# Patient Record
(demographics unavailable — no encounter records)

---

## 2025-07-09 NOTE — DISCUSSION/SUMMARY
[de-identified] : KIRSTEN ARANDA  is an 60 year-old female who presents to the clinic with months of left knee pain.  There also suffering from sciatica and were given a Medrol Dosepak and a spine surgeon referral.  The symptoms began after a traumatic twisting injury. The patient initially tried OTC medications/NSAIDs with some relief, even though short lasting. Exercise therapy has had only temporary relief.  Radiographic findings show no obvious fracture or deformity,however patient has had continued pain and is unable to return to their usual activities. Given the history along with the physical exam findings of lateral joint line pain and pain with deep squat, I am concerned about a possible meniscus tear. I discussed that radiographs show the bones well but do not show the soft tissues, such as ligaments, tendons and menisci well. At this point, the patient is quite debilitated by her  pain and is unable to return to her  activities of daily living such as []. Given the persistent symptoms, I discussed with the patient that her should continue to avoid strenuous activities while we obtain an MRI to further evaluate for an internal derangement of the knee. The office will work to obtain the MRI.   I discussed with them that I often prescribe an anti-inflammatory that should be taken once a day with meals to decrease pain and expedite symptom relief. They should not take this while also taking Aleve (Naprosyn), Motrin/ Advil (Ibuprofen), Toradol (ketoralac). They must stop taking it if they develops stomach pain, increased bleeding or bruising and they should follow-up with their primary care doctor for routine blood work including kidney function to monitor its effect. While it Is not a habit-forming substance, it should only  be taken as needed and to discontinue use once symptoms have resolved.  She will continue with the Medrol Dosepak instead.   In the interim, the patient should continue to walk and recommend an anti-inflammatory (Meloxicam) as directed if not medically contraindicated. They will schedule follow-up for in person review of the MRI results. They know to call the office or present to the ER if they develop worsening pain, swelling, numbness, tingling, inability to use the leg.   My cumulative time spent on this patients visit included: Preparation for the visit, review of the medical records, review of pertinent diagnostic studies, examination and counseling of the patient on the above diagnosis, treatment plan and prognosis, orders of diagnostic tests, medications and/or appropriate procedures and documentation in the medical records of todays visit.

## 2025-07-09 NOTE — PROCEDURE
[de-identified] : Injection: Left knee joint. Indication: Internal derangement of left kne.  A discussion was had with the patient regarding this procedure and all questions were answered. All risks, benefits and alternatives were discussed. These included but were not limited to bleeding, infection, allergic reaction and reaccumulation of fluid. A timeout was done to ensure correct side and patient agreed to the procedure.  A [place] uriah was created on the skin utilizing a plastic needle cap to uriah the anticipated point of entry. Alcohol was used to clean the skin, and chloraprep was used to sterilize and prep the area in the lateral joint line aspect of the knee. Ethyl chloride spray was then used as a topical anesthetic. A 22-gauge needle was used to inject 4cc 1% lidocaine without epinephrine,  and 2cc of 40mg/ml methylprednisolone into the knee. A sterile bandage was then applied. The patient tolerated the procedure well.

## 2025-07-09 NOTE — HISTORY OF PRESENT ILLNESS
[de-identified] : Patient presents for evaluation of left knee and low back pain.  She recently had a slip and fall while in [facility] and had worsening of her low back and left knee pain.  She notices a clicking sensation and pain with range of motion of the knee.  Is 7-8 out of 10 in severity and she is using a cane for assistive device.  She is also taking Tylenol however it is not helping significantly

## 2025-07-09 NOTE — PHYSICAL EXAM
[de-identified] : General Appearance / Station: Well developed, well nourished, in no acute distress  Orientation: Oriented to person, place, and time Gait & Station: Ambulates without assistive device Neurologic: Normal leg sensation  Cardiovascular: Warm extremity  Lymphatics: No lymphedema  Generalized Ligament Laxity: Normal  Stiffness: Normal   LUMBAR SPINE: tender  at lumbar spine and left paraspinal muscle Straight leg raise: Positive Motor: 5/5 motor L2-S1 Sensation: Intact  L2-S1  LEFT HIP: Range of motion: Painless  internal and external rotation of the hip. Strength: Within Normal Limits  Palpation: Nontender  at greater trochanter. Nontender  at SI joint Stinchfield: Negative  FADIR: Negative  SURAJ: Negative   SYMPTOMATIC LEFT KNEE: Alignment: Neutral Skin: normal Effusion: none . Quadriceps: normal . Range of motion: symmetrical but painful . PF crepitus: 1+. PF apprehension: none . Patella / Patella Tendon: nontender . Lachman's: negative  Valgus @ 30: negative. Varus @ 30: negative. Posterior drawer: negative. 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 No fracture, or dislocation seen at this time. There is grade 1 anterolisthesis L4-5 but no mobility of the lumbar spine seen on flexion/ extension views. No other obvious osseous abnormality. Otherwise unremarkable.  Imaging: AP Pelvis show no significant hip joint space narrowing. There are no signs of fracture. The soft tissues appear unremarkable  Imaging: Standing 4 views of the left knee show left knee mild medial joint space narrowing. There are no signs of fracture. There is no [] knee effusion. The soft tissues appear unremarkable

## 2025-07-17 NOTE — HISTORY OF PRESENT ILLNESS
[Pain Location] : pain [] : left foot [Lumbar] : lumbar region [___ mths] : [unfilled] month(s) ago [10] : a current pain level of 10/10 [Standing] : standing [Daily] : ~He/She~ states the symptoms seem to be occuring daily [Prolonged Sitting] : worsened by prolonged sitting [Prolonged Standing] : worsened by prolonged standing [Rest] : relieved by rest [de-identified] : Patient is a 60-year-old female with a longstanding history of lumbar pain.  In February 2025 patient had started buckling of the left knee, with a total of three significant falls,  secondary to increasing pain to that left knee.   Patient had this evaluated by Dr. Martinez who is requesting for an MRI of that left knee and is requesting the patient to consult with an Orthopedic Spine Specialist, Dr Reich, to rule out these falls that may be originating from lumbar spine secondary to her longstanding history of back pain.  [de-identified] : lying down in bed is the worst

## 2025-07-17 NOTE — REASON FOR VISIT
[Initial Visit] : an initial visit for [Back Pain] : back pain [Radiculopathy] : radiculopathy [Family Member] : family member [FreeTextEntry2] : left leg radiculopathy

## 2025-07-17 NOTE — HISTORY OF PRESENT ILLNESS
[Pain Location] : pain [] : left foot [Lumbar] : lumbar region [___ mths] : [unfilled] month(s) ago [10] : a current pain level of 10/10 [Standing] : standing [Daily] : ~He/She~ states the symptoms seem to be occuring daily [Prolonged Sitting] : worsened by prolonged sitting [Prolonged Standing] : worsened by prolonged standing [Rest] : relieved by rest [de-identified] : Patient is a 60-year-old female with a longstanding history of lumbar pain.  In February 2025 patient had started buckling of the left knee, with a total of three significant falls,  secondary to increasing pain to that left knee.   Patient had this evaluated by Dr. Martinez who is requesting for an MRI of that left knee and is requesting the patient to consult with an Orthopedic Spine Specialist, Dr Reich, to rule out these falls that may be originating from lumbar spine secondary to her longstanding history of back pain.  [de-identified] : lying down in bed is the worst

## 2025-07-17 NOTE — DISCUSSION/SUMMARY
[Medication Risks Reviewed] : Medication risks reviewed [Surgical risks reviewed] : Surgical risks reviewed [6 Weeks] : in 6 weeks [de-identified] : Prescription for physical therapy provided to the patient for core strengthening and hamstring stretching. Patient is to return in 6 weeks to discuss the progress of her back pain and radiculopathy into the left leg.  If symptoms worsen patient is to call the office and we will prescribe an MRI of the lumbar spine for further radiologic evaluation.  I, Dr. Epifanio Riech, personally performed the evaluation and management (E/M) services for this new patient.  That E/M includes conducting the initial examination, assessing all conditions, and establishing a plan of care.  Today, my ACP, Pretty Myers, was here to observe my evaluation and management services for this patient to be followed going forward.

## 2025-07-17 NOTE — PHYSICAL EXAM
[Knee] : patellar 2+ and symmetric bilaterally [Ankle] : ankle 2+ and symmetric bilaterally [Normal RLE] : Right Lower Extremity: No scars, rashes, lesions, ulcers, skin intact [Normal LLE] : Left Lower Extremity: No scars, rashes, lesions, ulcers, skin intact [Normal DTR Reflexes] : DTR reflexes normal [Normal] : No costovertebral angle tenderness and no spinal tenderness [Wide-Based] : wide-based [LE] : Sensory: Intact in bilateral lower extremities [Obese] : obese [de-identified] : pain to the left knee when heel and toe walk. negative tension sign to bilateral LEs [de-identified] : Xrays 4 views of the lumbar spine :  Good alignment of the lumbar spine. Lateral upright with noted mild L4-5 spondylolisthesis, multilevel degenerative changes.  Lateral views of flexion and extension with no gross instability.  No bony tumors, no obvious fractures.

## 2025-07-17 NOTE — PHYSICAL EXAM
[Knee] : patellar 2+ and symmetric bilaterally [Ankle] : ankle 2+ and symmetric bilaterally [Normal RLE] : Right Lower Extremity: No scars, rashes, lesions, ulcers, skin intact [Normal LLE] : Left Lower Extremity: No scars, rashes, lesions, ulcers, skin intact [Normal DTR Reflexes] : DTR reflexes normal [Normal] : No costovertebral angle tenderness and no spinal tenderness [Wide-Based] : wide-based [LE] : Sensory: Intact in bilateral lower extremities [Obese] : obese [de-identified] : pain to the left knee when heel and toe walk. negative tension sign to bilateral LEs [de-identified] : Xrays 4 views of the lumbar spine :  Good alignment of the lumbar spine. Lateral upright with noted mild L4-5 spondylolisthesis, multilevel degenerative changes.  Lateral views of flexion and extension with no gross instability.  No bony tumors, no obvious fractures.

## 2025-07-17 NOTE — DISCUSSION/SUMMARY
[Medication Risks Reviewed] : Medication risks reviewed [Surgical risks reviewed] : Surgical risks reviewed [6 Weeks] : in 6 weeks [de-identified] : Prescription for physical therapy provided to the patient for core strengthening and hamstring stretching. Patient is to return in 6 weeks to discuss the progress of her back pain and radiculopathy into the left leg.  If symptoms worsen patient is to call the office and we will prescribe an MRI of the lumbar spine for further radiologic evaluation.  I, Dr. Epifanio Reich, personally performed the evaluation and management (E/M) services for this new patient.  That E/M includes conducting the initial examination, assessing all conditions, and establishing a plan of care.  Today, my ACP, Pretty Myers, was here to observe my evaluation and management services for this patient to be followed going forward.

## 2025-07-30 NOTE — HISTORY OF PRESENT ILLNESS
[de-identified] : Patient presents for evaluation of left knee and low back pain. SHe presents for follow up of left knee MRI. Symptoms have mildly improved with left knee injection on 7/9/2025.

## 2025-07-30 NOTE — DISCUSSION/SUMMARY
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    The patient was extensively counseled on treatment options including but not limited to observation, rest/activity modification, bracing, anti-inflammatory medications, physical therapy, injections, and surgery. The natural history of the disease was thoroughly explained. We discussed that the majority of the time, this condition can be initially treated conservatively.   Patient would like to proceed with: - PT - Tylenol    I have personally obtained the history, reviewed the ROS as noted, and performed the physical examination today. The patient and I discussed the assessment and options and developed the plan. All questions were answered and the patient stated their understanding of the treatment plan and appreciation of the visit.   My cumulative time spent on this patient's visit included: Preparation for the visit, review of the medical records, review of pertinent diagnostic studies, examination and counseling of the patient on the above diagnosis, treatment plan and prognosis, orders of diagnostic tests, medications and/or appropriate procedures and documentation in the medical records of today's visit.   Michi Martinez MD

## 2025-07-30 NOTE — PHYSICAL EXAM
[de-identified] : General Appearance / Station: Well developed, well nourished, in no acute distress  Orientation: Oriented to person, place, and time Gait & Station: Ambulates without assistive device Neurologic: Normal leg sensation  Cardiovascular: Warm extremity  Lymphatics: No lymphedema  Generalized Ligament Laxity: Normal  Stiffness: Normal   LUMBAR SPINE: tender  at lumbar spine and left paraspinal muscle Straight leg raise: Positive Motor: 5/5 motor L2-S1 Sensation: Intact  L2-S1  LEFT HIP: Range of motion: Painless  internal and external rotation of the hip. Strength: Within Normal Limits  Palpation: Nontender  at greater trochanter. Nontender  at SI joint Stinchfield: Negative  FADIR: Negative  SURAJ: Negative   SYMPTOMATIC LEFT KNEE: Alignment: Neutral Skin: normal Effusion: none . Quadriceps: normal . Range of motion: symmetrical but painful . PF crepitus: 1+. PF apprehension: none . Patella / Patella Tendon: nontender . Lachman's: negative  Valgus @ 30: negative. Varus @ 30: negative. Posterior drawer: negative. Palpation: TENDER AT lateral joint line [de-identified] : MRI for [facility] shows chondral thinning of medial compartment with nondisplaced horizontal tear of medial meniscus.